# Patient Record
Sex: FEMALE | Race: WHITE | Employment: OTHER | URBAN - METROPOLITAN AREA
[De-identification: names, ages, dates, MRNs, and addresses within clinical notes are randomized per-mention and may not be internally consistent; named-entity substitution may affect disease eponyms.]

---

## 2019-08-19 PROCEDURE — 87624 HPV HI-RISK TYP POOLED RSLT: CPT | Performed by: PHYSICIAN ASSISTANT

## 2019-08-19 PROCEDURE — 88175 CYTOPATH C/V AUTO FLUID REDO: CPT | Performed by: PHYSICIAN ASSISTANT

## 2023-02-05 ENCOUNTER — APPOINTMENT (OUTPATIENT)
Dept: CT IMAGING | Facility: HOSPITAL | Age: 48
End: 2023-02-05
Attending: EMERGENCY MEDICINE
Payer: COMMERCIAL

## 2023-02-05 ENCOUNTER — APPOINTMENT (OUTPATIENT)
Dept: GENERAL RADIOLOGY | Facility: HOSPITAL | Age: 48
End: 2023-02-05
Attending: EMERGENCY MEDICINE
Payer: COMMERCIAL

## 2023-02-05 ENCOUNTER — HOSPITAL ENCOUNTER (EMERGENCY)
Facility: HOSPITAL | Age: 48
Discharge: HOME | End: 2023-02-05
Attending: EMERGENCY MEDICINE
Payer: COMMERCIAL

## 2023-02-05 VITALS
HEIGHT: 63 IN | RESPIRATION RATE: 16 BRPM | SYSTOLIC BLOOD PRESSURE: 94 MMHG | HEART RATE: 72 BPM | BODY MASS INDEX: 26.58 KG/M2 | WEIGHT: 150 LBS | DIASTOLIC BLOOD PRESSURE: 54 MMHG | OXYGEN SATURATION: 98 % | TEMPERATURE: 98 F

## 2023-02-05 DIAGNOSIS — S01.81XA FACIAL LACERATION, INITIAL ENCOUNTER: Primary | ICD-10-CM

## 2023-02-05 DIAGNOSIS — S02.609A CLOSED FRACTURE OF LEFT SIDE OF MANDIBLE, UNSPECIFIED MANDIBULAR SITE, INITIAL ENCOUNTER (HCC): ICD-10-CM

## 2023-02-05 DIAGNOSIS — S42.202A CLOSED FRACTURE OF PROXIMAL END OF LEFT HUMERUS, UNSPECIFIED FRACTURE MORPHOLOGY, INITIAL ENCOUNTER: ICD-10-CM

## 2023-02-05 DIAGNOSIS — S09.90XA INJURY OF HEAD, INITIAL ENCOUNTER: ICD-10-CM

## 2023-02-05 PROCEDURE — 73060 X-RAY EXAM OF HUMERUS: CPT | Performed by: EMERGENCY MEDICINE

## 2023-02-05 PROCEDURE — 90471 IMMUNIZATION ADMIN: CPT

## 2023-02-05 PROCEDURE — 72125 CT NECK SPINE W/O DYE: CPT | Performed by: EMERGENCY MEDICINE

## 2023-02-05 PROCEDURE — 96375 TX/PRO/DX INJ NEW DRUG ADDON: CPT

## 2023-02-05 PROCEDURE — 76377 3D RENDER W/INTRP POSTPROCES: CPT | Performed by: EMERGENCY MEDICINE

## 2023-02-05 PROCEDURE — 96376 TX/PRO/DX INJ SAME DRUG ADON: CPT

## 2023-02-05 PROCEDURE — 99285 EMERGENCY DEPT VISIT HI MDM: CPT

## 2023-02-05 PROCEDURE — 70450 CT HEAD/BRAIN W/O DYE: CPT | Performed by: EMERGENCY MEDICINE

## 2023-02-05 PROCEDURE — 12013 RPR F/E/E/N/L/M 2.6-5.0 CM: CPT

## 2023-02-05 PROCEDURE — 96374 THER/PROPH/DIAG INJ IV PUSH: CPT

## 2023-02-05 PROCEDURE — 99284 EMERGENCY DEPT VISIT MOD MDM: CPT

## 2023-02-05 PROCEDURE — 70486 CT MAXILLOFACIAL W/O DYE: CPT | Performed by: EMERGENCY MEDICINE

## 2023-02-05 RX ORDER — HYDROCODONE BITARTRATE AND ACETAMINOPHEN 5; 325 MG/1; MG/1
1-2 TABLET ORAL EVERY 6 HOURS PRN
Qty: 20 TABLET | Refills: 0 | Status: ON HOLD | OUTPATIENT
Start: 2023-02-05 | End: 2023-02-08

## 2023-02-05 RX ORDER — HYDROCODONE BITARTRATE AND ACETAMINOPHEN 5; 325 MG/1; MG/1
2 TABLET ORAL ONCE
Status: COMPLETED | OUTPATIENT
Start: 2023-02-05 | End: 2023-02-05

## 2023-02-05 RX ORDER — OXYCODONE HYDROCHLORIDE AND ACETAMINOPHEN 5; 325 MG/1; MG/1
1-2 TABLET ORAL EVERY 6 HOURS PRN
Qty: 20 TABLET | Refills: 0 | Status: SHIPPED | OUTPATIENT
Start: 2023-02-05 | End: 2023-02-08

## 2023-02-05 RX ORDER — ONDANSETRON 4 MG/1
4 TABLET, ORALLY DISINTEGRATING ORAL EVERY 4 HOURS PRN
Qty: 20 TABLET | Refills: 0 | Status: SHIPPED | OUTPATIENT
Start: 2023-02-05 | End: 2023-02-10

## 2023-02-05 RX ORDER — AMOXICILLIN AND CLAVULANATE POTASSIUM 875; 125 MG/1; MG/1
1 TABLET, FILM COATED ORAL 2 TIMES DAILY
Qty: 14 TABLET | Refills: 0 | Status: SHIPPED | OUTPATIENT
Start: 2023-02-05 | End: 2023-02-12

## 2023-02-05 RX ORDER — ONDANSETRON 2 MG/ML
4 INJECTION INTRAMUSCULAR; INTRAVENOUS ONCE
Status: COMPLETED | OUTPATIENT
Start: 2023-02-05 | End: 2023-02-05

## 2023-02-05 RX ORDER — BUPIVACAINE HYDROCHLORIDE 5 MG/ML
1 INJECTION, SOLUTION EPIDURAL; INTRACAUDAL ONCE
Status: COMPLETED | OUTPATIENT
Start: 2023-02-05 | End: 2023-02-05

## 2023-02-05 RX ORDER — MORPHINE SULFATE 4 MG/ML
4 INJECTION, SOLUTION INTRAMUSCULAR; INTRAVENOUS ONCE
Status: COMPLETED | OUTPATIENT
Start: 2023-02-05 | End: 2023-02-05

## 2023-02-05 RX ORDER — BUPIVACAINE HYDROCHLORIDE 5 MG/ML
INJECTION, SOLUTION EPIDURAL; INTRACAUDAL
Status: COMPLETED
Start: 2023-02-05 | End: 2023-02-05

## 2023-02-05 NOTE — DISCHARGE INSTRUCTIONS
Return to ER if fever, discharge, redness or other problems    Clean and dry for 48 hours    Polysporin to wound    Suture removal 10 days    You have a humerus fracture and mandibular fracture which will require orthopedic and oral surgery follow-up

## 2023-02-05 NOTE — ED INITIAL ASSESSMENT (HPI)
Pt sustained head lac with L upper arm deformity & L shoulder pain after falling while getting into a cab 2 hrs PTA, denies anyy LOC, admits to etoh consumption

## 2023-02-07 ENCOUNTER — ANESTHESIA EVENT (OUTPATIENT)
Dept: SURGERY | Facility: HOSPITAL | Age: 48
End: 2023-02-07
Payer: COMMERCIAL

## 2023-02-07 ENCOUNTER — HOSPITAL ENCOUNTER (OUTPATIENT)
Facility: HOSPITAL | Age: 48
Discharge: HOME | End: 2023-02-08
Attending: ORTHOPAEDIC SURGERY | Admitting: ORTHOPAEDIC SURGERY
Payer: COMMERCIAL

## 2023-02-07 ENCOUNTER — ANESTHESIA (OUTPATIENT)
Dept: SURGERY | Facility: HOSPITAL | Age: 48
End: 2023-02-07
Payer: COMMERCIAL

## 2023-02-07 ENCOUNTER — APPOINTMENT (OUTPATIENT)
Dept: GENERAL RADIOLOGY | Facility: HOSPITAL | Age: 48
End: 2023-02-07
Attending: ORTHOPAEDIC SURGERY
Payer: COMMERCIAL

## 2023-02-07 LAB
B-HCG UR QL: NEGATIVE
SARS-COV-2 RNA RESP QL NAA+PROBE: NOT DETECTED

## 2023-02-07 PROCEDURE — 0PSG04Z REPOSITION LEFT HUMERAL SHAFT WITH INTERNAL FIXATION DEVICE, OPEN APPROACH: ICD-10-PCS | Performed by: ORTHOPAEDIC SURGERY

## 2023-02-07 PROCEDURE — 76942 ECHO GUIDE FOR BIOPSY: CPT | Performed by: STUDENT IN AN ORGANIZED HEALTH CARE EDUCATION/TRAINING PROGRAM

## 2023-02-07 PROCEDURE — 81025 URINE PREGNANCY TEST: CPT

## 2023-02-07 PROCEDURE — 76000 FLUOROSCOPY <1 HR PHYS/QHP: CPT | Performed by: ORTHOPAEDIC SURGERY

## 2023-02-07 DEVICE — IMPLANTABLE DEVICE: Type: IMPLANTABLE DEVICE | Site: HUMERUS | Status: FUNCTIONAL

## 2023-02-07 RX ORDER — HYDROMORPHONE HYDROCHLORIDE 1 MG/ML
0.2 INJECTION, SOLUTION INTRAMUSCULAR; INTRAVENOUS; SUBCUTANEOUS EVERY 5 MIN PRN
Status: DISCONTINUED | OUTPATIENT
Start: 2023-02-07 | End: 2023-02-07 | Stop reason: HOSPADM

## 2023-02-07 RX ORDER — DEXAMETHASONE SODIUM PHOSPHATE 10 MG/ML
INJECTION, SOLUTION INTRAMUSCULAR; INTRAVENOUS AS NEEDED
Status: DISCONTINUED | OUTPATIENT
Start: 2023-02-07 | End: 2023-02-07 | Stop reason: SURG

## 2023-02-07 RX ORDER — OXYCODONE HYDROCHLORIDE AND ACETAMINOPHEN 5; 325 MG/1; MG/1
1-2 TABLET ORAL EVERY 6 HOURS PRN
Status: DISCONTINUED | OUTPATIENT
Start: 2023-02-07 | End: 2023-02-08

## 2023-02-07 RX ORDER — ONDANSETRON 2 MG/ML
INJECTION INTRAMUSCULAR; INTRAVENOUS AS NEEDED
Status: DISCONTINUED | OUTPATIENT
Start: 2023-02-07 | End: 2023-02-07 | Stop reason: SURG

## 2023-02-07 RX ORDER — SODIUM CHLORIDE, SODIUM LACTATE, POTASSIUM CHLORIDE, CALCIUM CHLORIDE 600; 310; 30; 20 MG/100ML; MG/100ML; MG/100ML; MG/100ML
INJECTION, SOLUTION INTRAVENOUS CONTINUOUS
Status: DISCONTINUED | OUTPATIENT
Start: 2023-02-07 | End: 2023-02-08

## 2023-02-07 RX ORDER — POLYETHYLENE GLYCOL 3350 17 G/17G
17 POWDER, FOR SOLUTION ORAL DAILY PRN
Status: DISCONTINUED | OUTPATIENT
Start: 2023-02-07 | End: 2023-02-08

## 2023-02-07 RX ORDER — ONDANSETRON 4 MG/1
4 TABLET, ORALLY DISINTEGRATING ORAL EVERY 4 HOURS PRN
Status: DISCONTINUED | OUTPATIENT
Start: 2023-02-07 | End: 2023-02-08

## 2023-02-07 RX ORDER — ACETAMINOPHEN 500 MG
1000 TABLET ORAL ONCE AS NEEDED
Status: DISCONTINUED | OUTPATIENT
Start: 2023-02-07 | End: 2023-02-07 | Stop reason: HOSPADM

## 2023-02-07 RX ORDER — SODIUM PHOSPHATE, DIBASIC AND SODIUM PHOSPHATE, MONOBASIC 7; 19 G/133ML; G/133ML
1 ENEMA RECTAL ONCE AS NEEDED
Status: DISCONTINUED | OUTPATIENT
Start: 2023-02-07 | End: 2023-02-08

## 2023-02-07 RX ORDER — NALOXONE HYDROCHLORIDE 0.4 MG/ML
80 INJECTION, SOLUTION INTRAMUSCULAR; INTRAVENOUS; SUBCUTANEOUS AS NEEDED
Status: DISCONTINUED | OUTPATIENT
Start: 2023-02-07 | End: 2023-02-07 | Stop reason: HOSPADM

## 2023-02-07 RX ORDER — DEXAMETHASONE SODIUM PHOSPHATE 4 MG/ML
VIAL (ML) INJECTION AS NEEDED
Status: DISCONTINUED | OUTPATIENT
Start: 2023-02-07 | End: 2023-02-07 | Stop reason: SURG

## 2023-02-07 RX ORDER — BUPRENORPHINE HYDROCHLORIDE 0.32 MG/ML
INJECTION INTRAMUSCULAR; INTRAVENOUS AS NEEDED
Status: DISCONTINUED | OUTPATIENT
Start: 2023-02-07 | End: 2023-02-07 | Stop reason: SURG

## 2023-02-07 RX ORDER — HYDROMORPHONE HYDROCHLORIDE 1 MG/ML
0.6 INJECTION, SOLUTION INTRAMUSCULAR; INTRAVENOUS; SUBCUTANEOUS EVERY 5 MIN PRN
Status: DISCONTINUED | OUTPATIENT
Start: 2023-02-07 | End: 2023-02-07 | Stop reason: HOSPADM

## 2023-02-07 RX ORDER — IBUPROFEN 200 MG
800 TABLET ORAL EVERY 6 HOURS PRN
COMMUNITY

## 2023-02-07 RX ORDER — ONDANSETRON 2 MG/ML
4 INJECTION INTRAMUSCULAR; INTRAVENOUS EVERY 6 HOURS PRN
Status: DISCONTINUED | OUTPATIENT
Start: 2023-02-07 | End: 2023-02-08

## 2023-02-07 RX ORDER — SCOLOPAMINE TRANSDERMAL SYSTEM 1 MG/1
1 PATCH, EXTENDED RELEASE TRANSDERMAL ONCE
Status: DISCONTINUED | OUTPATIENT
Start: 2023-02-07 | End: 2023-02-07 | Stop reason: HOSPADM

## 2023-02-07 RX ORDER — HYDROCODONE BITARTRATE AND ACETAMINOPHEN 5; 325 MG/1; MG/1
1-2 TABLET ORAL EVERY 6 HOURS PRN
Status: DISCONTINUED | OUTPATIENT
Start: 2023-02-07 | End: 2023-02-08

## 2023-02-07 RX ORDER — PROCHLORPERAZINE EDISYLATE 5 MG/ML
5 INJECTION INTRAMUSCULAR; INTRAVENOUS EVERY 8 HOURS PRN
Status: DISCONTINUED | OUTPATIENT
Start: 2023-02-07 | End: 2023-02-08

## 2023-02-07 RX ORDER — GLYCOPYRROLATE 0.2 MG/ML
INJECTION, SOLUTION INTRAMUSCULAR; INTRAVENOUS AS NEEDED
Status: DISCONTINUED | OUTPATIENT
Start: 2023-02-07 | End: 2023-02-07 | Stop reason: SURG

## 2023-02-07 RX ORDER — DIPHENHYDRAMINE HYDROCHLORIDE 50 MG/ML
INJECTION INTRAMUSCULAR; INTRAVENOUS AS NEEDED
Status: DISCONTINUED | OUTPATIENT
Start: 2023-02-07 | End: 2023-02-07 | Stop reason: SURG

## 2023-02-07 RX ORDER — ONDANSETRON 2 MG/ML
4 INJECTION INTRAMUSCULAR; INTRAVENOUS EVERY 6 HOURS PRN
Status: DISCONTINUED | OUTPATIENT
Start: 2023-02-07 | End: 2023-02-07 | Stop reason: HOSPADM

## 2023-02-07 RX ORDER — MEPERIDINE HYDROCHLORIDE 25 MG/ML
12.5 INJECTION INTRAMUSCULAR; INTRAVENOUS; SUBCUTANEOUS AS NEEDED
Status: DISCONTINUED | OUTPATIENT
Start: 2023-02-07 | End: 2023-02-07 | Stop reason: HOSPADM

## 2023-02-07 RX ORDER — ROPIVACAINE HYDROCHLORIDE 5 MG/ML
INJECTION, SOLUTION EPIDURAL; INFILTRATION; PERINEURAL AS NEEDED
Status: DISCONTINUED | OUTPATIENT
Start: 2023-02-07 | End: 2023-02-07 | Stop reason: SURG

## 2023-02-07 RX ORDER — NEOSTIGMINE METHYLSULFATE 1 MG/ML
INJECTION, SOLUTION INTRAVENOUS AS NEEDED
Status: DISCONTINUED | OUTPATIENT
Start: 2023-02-07 | End: 2023-02-07 | Stop reason: SURG

## 2023-02-07 RX ORDER — SODIUM CHLORIDE, SODIUM LACTATE, POTASSIUM CHLORIDE, CALCIUM CHLORIDE 600; 310; 30; 20 MG/100ML; MG/100ML; MG/100ML; MG/100ML
INJECTION, SOLUTION INTRAVENOUS CONTINUOUS
Status: DISCONTINUED | OUTPATIENT
Start: 2023-02-07 | End: 2023-02-07 | Stop reason: HOSPADM

## 2023-02-07 RX ORDER — LIDOCAINE HYDROCHLORIDE 10 MG/ML
INJECTION, SOLUTION EPIDURAL; INFILTRATION; INTRACAUDAL; PERINEURAL AS NEEDED
Status: DISCONTINUED | OUTPATIENT
Start: 2023-02-07 | End: 2023-02-07 | Stop reason: SURG

## 2023-02-07 RX ORDER — MIDAZOLAM HYDROCHLORIDE 1 MG/ML
INJECTION INTRAMUSCULAR; INTRAVENOUS AS NEEDED
Status: DISCONTINUED | OUTPATIENT
Start: 2023-02-07 | End: 2023-02-07 | Stop reason: SURG

## 2023-02-07 RX ORDER — SENNOSIDES 8.6 MG
17.2 TABLET ORAL NIGHTLY
Status: DISCONTINUED | OUTPATIENT
Start: 2023-02-07 | End: 2023-02-08

## 2023-02-07 RX ORDER — AMOXICILLIN AND CLAVULANATE POTASSIUM 875; 125 MG/1; MG/1
1 TABLET, FILM COATED ORAL 2 TIMES DAILY
Status: DISCONTINUED | OUTPATIENT
Start: 2023-02-07 | End: 2023-02-08

## 2023-02-07 RX ORDER — DOCUSATE SODIUM 100 MG/1
100 CAPSULE, LIQUID FILLED ORAL 2 TIMES DAILY
Status: DISCONTINUED | OUTPATIENT
Start: 2023-02-07 | End: 2023-02-08

## 2023-02-07 RX ORDER — PROCHLORPERAZINE EDISYLATE 5 MG/ML
5 INJECTION INTRAMUSCULAR; INTRAVENOUS EVERY 8 HOURS PRN
Status: DISCONTINUED | OUTPATIENT
Start: 2023-02-07 | End: 2023-02-07 | Stop reason: HOSPADM

## 2023-02-07 RX ORDER — BISACODYL 10 MG
10 SUPPOSITORY, RECTAL RECTAL
Status: DISCONTINUED | OUTPATIENT
Start: 2023-02-07 | End: 2023-02-08

## 2023-02-07 RX ORDER — MIDAZOLAM HYDROCHLORIDE 1 MG/ML
1 INJECTION INTRAMUSCULAR; INTRAVENOUS EVERY 5 MIN PRN
Status: DISCONTINUED | OUTPATIENT
Start: 2023-02-07 | End: 2023-02-07 | Stop reason: HOSPADM

## 2023-02-07 RX ORDER — ROCURONIUM BROMIDE 10 MG/ML
INJECTION, SOLUTION INTRAVENOUS AS NEEDED
Status: DISCONTINUED | OUTPATIENT
Start: 2023-02-07 | End: 2023-02-07 | Stop reason: SURG

## 2023-02-07 RX ORDER — DIPHENHYDRAMINE HYDROCHLORIDE 50 MG/ML
12.5 INJECTION INTRAMUSCULAR; INTRAVENOUS AS NEEDED
Status: DISCONTINUED | OUTPATIENT
Start: 2023-02-07 | End: 2023-02-07 | Stop reason: HOSPADM

## 2023-02-07 RX ORDER — CEFAZOLIN SODIUM/WATER 2 G/20 ML
2 SYRINGE (ML) INTRAVENOUS ONCE
Status: COMPLETED | OUTPATIENT
Start: 2023-02-07 | End: 2023-02-07

## 2023-02-07 RX ORDER — HYDROCODONE BITARTRATE AND ACETAMINOPHEN 10; 325 MG/1; MG/1
1 TABLET ORAL ONCE AS NEEDED
Status: DISCONTINUED | OUTPATIENT
Start: 2023-02-07 | End: 2023-02-07 | Stop reason: HOSPADM

## 2023-02-07 RX ORDER — HYDROMORPHONE HYDROCHLORIDE 1 MG/ML
0.4 INJECTION, SOLUTION INTRAMUSCULAR; INTRAVENOUS; SUBCUTANEOUS EVERY 5 MIN PRN
Status: DISCONTINUED | OUTPATIENT
Start: 2023-02-07 | End: 2023-02-07 | Stop reason: HOSPADM

## 2023-02-07 RX ORDER — IBUPROFEN 400 MG/1
800 TABLET ORAL EVERY 6 HOURS PRN
Status: DISCONTINUED | OUTPATIENT
Start: 2023-02-07 | End: 2023-02-08

## 2023-02-07 RX ORDER — HYDROCODONE BITARTRATE AND ACETAMINOPHEN 10; 325 MG/1; MG/1
2 TABLET ORAL ONCE AS NEEDED
Status: DISCONTINUED | OUTPATIENT
Start: 2023-02-07 | End: 2023-02-07 | Stop reason: HOSPADM

## 2023-02-07 RX ORDER — CEFAZOLIN SODIUM/WATER 2 G/20 ML
2 SYRINGE (ML) INTRAVENOUS EVERY 8 HOURS
Status: COMPLETED | OUTPATIENT
Start: 2023-02-08 | End: 2023-02-08

## 2023-02-07 RX ORDER — ACETAMINOPHEN 500 MG
1000 TABLET ORAL ONCE
Status: DISCONTINUED | OUTPATIENT
Start: 2023-02-07 | End: 2023-02-07 | Stop reason: HOSPADM

## 2023-02-07 RX ORDER — DOXEPIN HYDROCHLORIDE 50 MG/1
1 CAPSULE ORAL DAILY
Status: DISCONTINUED | OUTPATIENT
Start: 2023-02-08 | End: 2023-02-08

## 2023-02-07 RX ADMIN — DIPHENHYDRAMINE HYDROCHLORIDE 12.5 MG: 50 INJECTION INTRAMUSCULAR; INTRAVENOUS at 20:08:00

## 2023-02-07 RX ADMIN — LIDOCAINE HYDROCHLORIDE 50 MG: 10 INJECTION, SOLUTION EPIDURAL; INFILTRATION; INTRACAUDAL; PERINEURAL at 19:40:00

## 2023-02-07 RX ADMIN — ONDANSETRON 4 MG: 2 INJECTION INTRAMUSCULAR; INTRAVENOUS at 22:07:00

## 2023-02-07 RX ADMIN — DEXAMETHASONE SODIUM PHOSPHATE 4 MG: 4 MG/ML VIAL (ML) INJECTION at 20:08:00

## 2023-02-07 RX ADMIN — ROCURONIUM BROMIDE 50 MG: 10 INJECTION, SOLUTION INTRAVENOUS at 19:53:00

## 2023-02-07 RX ADMIN — BUPRENORPHINE HYDROCHLORIDE 150 MCG: 0.32 INJECTION INTRAMUSCULAR; INTRAVENOUS at 19:39:00

## 2023-02-07 RX ADMIN — SODIUM CHLORIDE, SODIUM LACTATE, POTASSIUM CHLORIDE, CALCIUM CHLORIDE: 600; 310; 30; 20 INJECTION, SOLUTION INTRAVENOUS at 20:58:00

## 2023-02-07 RX ADMIN — CEFAZOLIN SODIUM/WATER 2 G: 2 G/20 ML SYRINGE (ML) INTRAVENOUS at 20:12:00

## 2023-02-07 RX ADMIN — MIDAZOLAM HYDROCHLORIDE 2 MG: 1 INJECTION INTRAMUSCULAR; INTRAVENOUS at 19:30:00

## 2023-02-07 RX ADMIN — DEXAMETHASONE SODIUM PHOSPHATE 2 MG: 10 INJECTION, SOLUTION INTRAMUSCULAR; INTRAVENOUS at 19:39:00

## 2023-02-07 RX ADMIN — NEOSTIGMINE METHYLSULFATE 2.5 MG: 1 INJECTION, SOLUTION INTRAVENOUS at 22:17:00

## 2023-02-07 RX ADMIN — GLYCOPYRROLATE 0.4 MG: 0.2 INJECTION, SOLUTION INTRAMUSCULAR; INTRAVENOUS at 22:17:00

## 2023-02-07 RX ADMIN — ROPIVACAINE HYDROCHLORIDE 30 ML: 5 INJECTION, SOLUTION EPIDURAL; INFILTRATION; PERINEURAL at 19:39:00

## 2023-02-08 VITALS
RESPIRATION RATE: 18 BRPM | TEMPERATURE: 98 F | WEIGHT: 155 LBS | SYSTOLIC BLOOD PRESSURE: 102 MMHG | OXYGEN SATURATION: 100 % | BODY MASS INDEX: 27.46 KG/M2 | DIASTOLIC BLOOD PRESSURE: 50 MMHG | HEIGHT: 63 IN | HEART RATE: 70 BPM

## 2023-02-08 LAB
HCT VFR BLD AUTO: 32.5 %
HGB BLD-MCNC: 10.7 G/DL

## 2023-02-08 PROCEDURE — 97165 OT EVAL LOW COMPLEX 30 MIN: CPT

## 2023-02-08 PROCEDURE — 85014 HEMATOCRIT: CPT | Performed by: ORTHOPAEDIC SURGERY

## 2023-02-08 PROCEDURE — 97535 SELF CARE MNGMENT TRAINING: CPT

## 2023-02-08 PROCEDURE — 85018 HEMOGLOBIN: CPT | Performed by: ORTHOPAEDIC SURGERY

## 2023-02-08 PROCEDURE — 97110 THERAPEUTIC EXERCISES: CPT

## 2023-02-08 RX ORDER — HYDROCODONE BITARTRATE AND ACETAMINOPHEN 5; 325 MG/1; MG/1
1-2 TABLET ORAL EVERY 6 HOURS PRN
Qty: 20 TABLET | Refills: 0 | Status: SHIPPED | OUTPATIENT
Start: 2023-02-08 | End: 2023-02-13

## 2023-02-08 NOTE — PLAN OF CARE
Patient A&Ox4, VSS on room air. POD #1, dressing changed this morning per MD orders. New dressing C/D/I. Ice applied. Reporting moderate pain to R arm, PO pain medication given with adequate relief. Ambulating with therapy, tolerating well. Plan to DC home today, plan of care reviewed with patient and  at bedside, all questions answered, verbalized understanding.

## 2023-02-08 NOTE — PLAN OF CARE
A&Ox 4. VSS. On RA . . IS encouraged. SCDs on BLE. Ankle Pumps encouraged. Tolerating general  diet. Last BM 2/6 . Voiding to the bathroom. Pain controlled. Ambulating. Plan is PT/OT. Patient updated on plan of care. Safety precautions in place. Call light within reach. Left arm in sling. Microfoam dressing intact. Ice pack applied. Left arm precautions.

## 2023-02-08 NOTE — ANESTHESIA POSTPROCEDURE EVALUATION
[facility] Patient Status:  Hospital Outpatient Surgery   Age/Gender 52year old female MRN [id number]   [facility] SURGERY Attending Donel Eisenmenger, MD   Hosp Day # 0 PCP RAHEEL Celis       Anesthesia Post-op Note    OPEN REDUCTION INTERNAL FIXATION LEFT HUMERAL SHAFT FRACTURE    Procedure Summary     Date: 02/07/23 Room / Location: 1404 [facility] MAIN OR 05 / 1404 [facility] OR    Anesthesia Start: 1927 Anesthesia Stop:     Procedure: OPEN REDUCTION INTERNAL FIXATION LEFT HUMERAL SHAFT FRACTURE (Left: Upper Arm) Diagnosis:       Fracture of humeral shaft, left, closed      (Fracture of humeral shaft, left, closed Corey Fails)    Surgeons: Donel Eisenmenger, MD Anesthesiologist: Pepe Sanchez MD    Anesthesia Type: general, regional ASA Status: 2          Anesthesia Type: general, regional    Vitals Value Taken Time   /78 02/07/23 2227   Temp 97.4 02/07/23 2229   Pulse 85 02/07/23 2229   Resp 24 02/07/23 2229   SpO2 99 % 02/07/23 2229   Vitals shown include unvalidated device data. Patient Location: PACU    Anesthesia Type: general and regional    Airway Patency: extubated    Postop Pain Control: adequate    Mental Status: mildly sedated but able to meaningfully participate in the post-anesthesia evaluation    Nausea/Vomiting: none    Cardiopulmonary/Hydration status: stable euvolemic    Complications: no apparent anesthesia related complications    Postop vital signs: stable    Dental Exam: Unchanged from Preop    Patient to be discharged from PACU when criteria met.

## 2023-02-08 NOTE — ANESTHESIA PROCEDURE NOTES
Regional Block    Date/Time: 2/7/2023 7:30 PM  Performed by: Pepe Sanchez MD  Authorized by: Pepe Sanchez MD       General Information and Staff    Start Time:  2/7/2023 7:30 PM  End Time:  2/7/2023 7:39 PM  Anesthesiologist:  Pepe Sanchez MD  Performed by: Anesthesiologist  Patient Location:  OR    Block Placement: Pre Induction  Site Identification: real time ultrasound guided and image stored and retrievable    Block site/laterality marked before start: site marked  Reason for Block: at surgeon's request and post-op pain management    Preanesthetic Checklist: 2 patient identifers, IV checked, risks and benefits discussed, monitors and equipment checked, pre-op evaluation, timeout performed, anesthesia consent, sterile technique used, no prohibitive neurological deficits and no local skin infection at insertion site      Procedure Details    Patient Position:  Supine  Prep: ChloraPrep    Monitoring:  Cardiac monitor, continuous pulse ox and blood pressure cuff  Block Type: Interscalene  Laterality:  Left  Injection Technique:  Single-shot    Needle    Needle Type:  Short-bevel and echogenic  Needle Gauge:  22 G  Needle Length:  50 mm  Needle Localization:  Ultrasound guidance and nerve stimulator  Reason for Ultrasound Use: appropriate spread of the medication was noted in real time and no ultrasound evidence of intravascular and/or intraneural injection    Nerve Stimulator: 0.5 amps  Muscle Twitch Response: deltoid response      Assessment    Injection Assessment:  Good spread noted, negative resistance, negative aspiration for heme, incremental injection and low pressure  Heart Rate Change: No    - Patient tolerated block procedure well without evidence of immediate block related complications.      Medications  2/7/2023 7:30 PM      Additional Comments    Medication:  Ropivacaine 0.5% 30mL with 2mg PF Decadron and 150 mcg Buprenorphine

## 2023-02-08 NOTE — OPERATIVE REPORT
[address]    PATIENT'S NAME: Sherly Lock   ATTENDING PHYSICIAN: Ezekiel Mcgraw M.D. OPERATING PHYSICIAN: Ezekiel Mcgraw M.D. PATIENT ACCOUNT#:   [de-identified]    LOCATION:  [address] [place] [address]  MEDICAL RECORD #:   [id number]       YOB: 1975  ADMISSION DATE:       02/07/2023      OPERATION DATE:  02/07/2023    OPERATIVE REPORT    PREOPERATIVE DIAGNOSIS:  Left humeral shaft fracture. POSTOPERATIVE DIAGNOSIS:  Left humeral shaft fracture. PROCEDURE:  Open reduction external fixation of left humeral shaft fracture. ANESTHESIA:  General plus interscalene block. BLOOD LOSS:  150 mL. SPECIMENS:  None. COMPLICATIONS:  None. DISPOSITION:  Fair condition to the recovery room. PLAN:  Patient will be admitted for postoperative observation. INDICATIONS:  The patient is a 80-year-old female who had fallen and sustained an injury to her left arm. She had a displaced comminuted proximal humerus fracture. She was, therefore, offered surgical intervention. The risks and benefits of the procedure were discussed in detail with the patient and her family including risks of problems with skin and bone healing, stiffness, skin healing problems, infection. She shows good understanding of these issues and wished to proceed with surgery. FINDINGS:  Patient with comminuted fracture of the proximal third of the humeral shaft. OPERATIVE TECHNIQUE:  On date of operation, I saw the patient in the holding room and initialed the surgical site. The patient was taken the operating room and was placed in supine position on the OR table. Interscalene block was performed by Anesthesia. This was followed by general endotracheal anesthesia. The patient was given a preoperative dose of antibiotics. She was placed in a beach chair position. The left shoulder was prepped and draped in standard surgical fashion.   A surgical time-out was taken in which the proper patient, surgical site, and procedure were verified. An anterior approach to the proximal humerus which extended into an anterior approach to the humeral shaft was performed. Dissection was carried down through soft tissue and full-thickness skin flaps were raised. The interval between the deltoid and pectoralis major was opened proximally, and care was taken to preserve the insertion of the deltoid in the humerus. Distally, the biceps was retracted medially and then the brachialis was then divided between its middle and lateral thirds to expose the humeral shaft fracture. The fracture of the humerus was a fracture with comminution at the insertion site of the deltoid. The [employer] S3 plate was then utilized. The plate was placed proximally just lateral to the long head of the biceps. The central guidewire was placed and multiple fluoroscopic images were taken to ensure good position as far as the humeral head was concerned. Smooth pegs were then placed proximally using 4 of these to secure the proximal fragment. The main fragment was then reduced to the plate and, while holding this in reduced position, a lag screw was then placed. Another lag screw was placed across the long oblique fragment. Three screws were then placed distally to secure the distal fragment. Following this, multiple fluoroscopic images were taken to ensure near-anatomic reduction as well as good placement of the hardware and the plate. The arm was placed through a full range of motion, and the fixation was stable. The wound was copiously irrigated, and the skin flaps closed with 2-0 Vicryl in subcutaneous tissue, followed by staples. Sterile bulky dressings were applied. The patient was taken to the recovery room in fair condition. She will be admitted for postoperative observation.      Dictated By Luis Fernando Grimm M.D.  d: 02/07/2023 22:36:04  t: 02/07/2023 79:72:52  [facility] [id number]  /

## 2023-02-08 NOTE — PROGRESS NOTES
Norco to be delivered when filled , will dc home shortly after, AVS reviewed w/ Collinston Seip, spouse at bedside.

## 2023-02-08 NOTE — DISCHARGE INSTRUCTIONS
Move fingers often, move arm as tolerated  Elevate and ice the extremity  Keep dressings in place  Sling as needed  Return to clinic next week                  Home Care Instructions Following Your Fracture Repair     Vandana-  We hope you were pleased with your care at [facility].  We wish you the best outcome and overall experience with your operation. These instructions will help to minimize pain, limit the risk of infection, and improve the likelihood of a successful result. Splint  Your splint will help protect the repair of your fracture. Keep your splint on at all times  Do not remove your splint  Do not get your splint wet. Keep your splint clean and dry. Wear splint when sleeping. Bathing/Showers  You can shower with a plastic bag over your bandage. Keep your splint dry. No swimming, baths, or hot tubs until you receive medical permission. Cold Therapy  Cold therapy will help minimize swelling, improve, comfort, and limit the need for pain medication. Apply a bag of ice wrapped in a towel for 20 minutes three times daily  An ice bag should never come in direct contact with the skin. Immediately contact Dr. [facility]OROCHO [facility], if you experience excruciating pain not helped by pain medication, burning, blisters, redness, discoloration, or other changes in skin appearance    Hand Elevation  Strict hand elevation will help to minimize pain, improve healing, and decrease the risk of infection. Keep hand elevated on two pillows when sitting or lying  Maintain hand above the level of your heart as much as possible  Keep hand at shoulder level during the day. If your hand starts to throb, hurt, and swell; It might be a sign that you are not elevating it. Pain Medication: Norco or Tylenol #3  Dr. Madeline Moulton will prescribe either Norco or Tylenol #3 for your pain. Norco or Tylenol #3: Take one or two tablets every four hours as needed for pain.   Do not exceed 8 (eight) tablets each day  Do not take Norco/Tylenol #3, if you do not have pain. Over-The-Counter Medication  Non-prescription anti-inflammatory medications can also help to ease the pain. You can take Aleve or ibuprofen   Take as directed on the bottle  Drink a full glass of water with the medication    Home Medication  Resume your home medications as instructed    Diet  Resume your normal diet    Activity  No strenuous activity  You can go up and down the stairs as tolerated. Use common sense  You cannot return to work, if your work requires strenuous activity with your arms or hands. You cannot return to sports or physical exercise until you have received medical permission. Return to Work or [place] can return to work in 2-3, if your work does not involve strenuous activity with your arms or hands  You can return to school in 2-3 days but not gym class for 4-6 weeks. Driving  Do not drive, if you are wearing a splint and/or taking pain medication. Follow-up Appointment With The Hand Therapist (Occupational Therapist)  An appointment with the hand therapist was arranged for 3-5 days after your operation at the time your procedure was scheduled. Call Dr. Carrie Martell office today for an appointment with the hand therapist in 3-5 days, if you cannot remember it. Verify your appointment date, day, time, and location. At your 1st postoperative office visit:  Your splint will be removed and the wounds will be evaluated, and any additional concerns and instructions will be discussed. Follow-up Appointment with Dr. Castillo Felton  Call Dr. Milton Cardenas office today for an appointment in 10-14 days. Verify your appointment date, day, time, and location. At your 1st postoperative office visit:  Your wounds will be evaluated, healing assessed, and any additional concerns and instructions will be discussed.     Questions or Concerns  Call Dr. Milton Cardenas office if you experience severe pain not controlled by pain medication, swelling, numbness, tingling, bleeding, fever, or other concerns. If your call is made after office hours, a physician's assistant or nurse practitioner will be available to help you. There is always a surgeon covering Dr. Pacheco Martínez patients, if he is unavailable. Vandana  Thank you for coming to [facility] for your operation. The nurses and the anesthesiologist try very hard to make sure you receive the best care possible. Your trust in them is greatly appreciated.     Thanks so much,  Dr. Wise Matters

## 2023-02-08 NOTE — ANESTHESIA PROCEDURE NOTES
Airway  Date/Time: 2/7/2023 7:46 PM  Urgency: elective      General Information and Staff    Patient location during procedure: OR  Anesthesiologist: Abhishek Fajardo MD  Performed: anesthesiologist     Indications and Patient Condition  Indications for airway management: anesthesia  Sedation level: deep  Preoxygenated: yes  Patient position: sniffing  Mask difficulty assessment: 1 - vent by mask    Final Airway Details  Final airway type: endotracheal airway      Successful airway: ETT  Cuffed: yes   Successful intubation technique: direct laryngoscopy  Endotracheal tube insertion site: oral  Blade: Magdiel  Blade size: #3  ETT size (mm): 7.0    Cormack-Lehane Classification: grade IIA - partial view of glottis  Placement verified by: chest auscultation and capnometry   Measured from: lips  ETT to lips (cm): 20  Number of attempts at approach: 1

## 2023-02-08 NOTE — PHYSICAL THERAPY NOTE
Orders received and hx and chart reviewed. Per OT, Tati Montez, pt evaluated and does not have any skilled inpt PT needs at this time. Thank you.

## 2023-02-08 NOTE — BRIEF OP NOTE
Pre-Operative Diagnosis: Fracture of humeral shaft, left, closed [S42.302A]     Post-Operative Diagnosis: Fracture of humeral shaft, left, closed [S42.302A]      Procedure Performed:   OPEN REDUCTION INTERNAL FIXATION LEFT HUMERAL SHAFT FRACTURE    Surgeon(s) and Role:     Claude Arguelles MD - Primary    Assistant(s):        Surgical Findings: comminuted shaft fx     Specimen: none     Estimated Blood Loss: Blood Output: 150 mL (2/7/2023 10:04 PM)      Dictation Number:       Aleemike Henriquez MD  2/7/2023  10:32 PM

## 2023-02-08 NOTE — H&P
Hu Damian MD - 2023 10:30 AM CST  Formatting of this note is different from the original.  Subjective:   Patient ID: Peewee Viveros is a 52year old female. She is a right-hand-dominant female comes today for evaluation left arm. She fell on the evening of  and sustained injury to the left arm. She had difficulty with motion of the arm. She presented to the [facility] emergency department and x-rays did show a fracture of the humeral shaft. She has continued pain. She denies any numbness or tingling. History/Other:   Review of Systems   Constitutional: Negative for chills, diaphoresis and fever. Respiratory: Negative for cough, choking and shortness of breath. Cardiovascular: Negative for chest pain. Gastrointestinal: Negative for diarrhea, nausea and vomiting. Neurological: Negative. Psychiatric/Behavioral: Negative. Current Outpatient Medications   Medication Sig Dispense Refill   Albuterol Sulfate HFA (PROAIR HFA) 108 (90 Base) MCG/ACT Inhalation Aero Soln Inhale 2 puffs into the lungs every 4 (four) hours as needed. 1 Inhaler 0     Allergies:No Known Allergies    HISTORY:  Past Medical History:   Diagnosis Date   Breast nodule    Graves disease   remission since    Toxic diffuse goiter     Past Surgical History:   Procedure Laterality Date   BENIGN BIOPSY RIGHT    Right breast    , 2005   x 2   OTHER SURGICAL HISTORY In 2009   Right breast duct removed   TONSILLECTOMY At age 15   VASECTOMY    had. Family History   Problem Relation Age of Onset   Thyroid Disorder Sister   Hypothyroidism   Hypertension Mother   Cancer Mother   Other (colon ca) Mother   diagnosed at 77   Diabetes Maternal Grandmother   maternal side   Diabetes Maternal Grandfather   Thyroid Disorder Sister   Lupus. Lymes disease. Thyroid Disorder Sister   Bhavna Mass immune\" disease. Psychiatric Sister   Mood disorder.      Social History: Social History  Tobacco Use  Smoking status: Former  Packs/day: 0.50  Types: Cigarettes  Quit date: 2005  Years since quittin.1  Smokeless tobacco: Never  Tobacco comments: smoked for 4 years on and off. Alcohol use: Yes  Alcohol/week: 2.0 - 3.0 standard drinks  Types: 2 - 3 Standard drinks or equivalent per week  Comment: Social  Drug use: No      Objective:   Physical Exam  Constitutional:   Appearance: Normal appearance. HENT:   Head: Normocephalic. Cardiovascular:   Rate and Rhythm: Normal rate and regular rhythm. Pulses: Normal pulses. Heart sounds: Normal heart sounds. Pulmonary:   Effort: Pulmonary effort is normal.   Breath sounds: Normal breath sounds. Abdominal:   General: Bowel sounds are normal.   Palpations: Abdomen is soft. Musculoskeletal:   Cervical back: Normal range of motion and neck supple. Comments: Examination left arm shows tenderness palpation at the proximal humerus and pain with any attempted motion of the shoulder. She is able to move the fingers and sensation motor function are intact distally. The skin is intact. Skin:  General: Skin is warm and dry. Capillary Refill: Capillary refill takes less than 2 seconds. Neurological:   General: No focal deficit present. Mental Status: She is alert and oriented to person, place, and time. Psychiatric:   Mood and Affect: Mood normal.   Behavior: Behavior normal.   Thought Content: Thought content normal.   Judgment: Judgment normal.     Assessment & Plan:   Closed displaced comminuted fracture of shaft of left humerus, initial encounter (primary encounter diagnosis)    Orders Placed This Encounter  OFFICE/OUTPT VISIT,ARNIE COLVIN III    In discussing treatment options with the patient and her , I was able to review the x-rays. This did show evidence of a fracture of the proximal third of the humeral shaft with a butterfly fragment and displacement. Treatment option discussed including operative versus nonoperative care.  At this point based on the degree of comminution and displacement they would like to proceed with surgery. The risk and benefits surgery including risk of incomplete and delayed recovery of function, chronic pain, and stiffness were discussed. They show good understanding these issues. We can offer proceed with surgery in the next few days. Meds This Visit:  Requested Prescriptions     No prescriptions requested or ordered in this encounter     Imaging & Referrals:  [facility] SURG [address]  Electronically signed by Florentin Armijo MD at 02/06/2023 11:35 AM CST  Maria Ines Colin MD    Pre-op Diagnosis: Fracture of humeral shaft, left, closed [S42.302A]    The above referenced H&P was reviewed by Maria Ines Colin MD on 2/7/2023, the patient was examined and no significant changes have occurred in the patient's condition since the H&P was performed. I discussed with the patient and/or legal representative the potential benefits, risks and side effects of this procedure; the likelihood of the patient achieving goals; and potential problems that might occur during recuperation. I discussed reasonable alternatives to the procedure, including risks, benefits and side effects related to the alternatives and risks related to not receiving this procedure. We will proceed with procedure as planned.     Maria Ines Colin MD

## (undated) DEVICE — 3M™ IOBAN™ 2 ANTIMICROBIAL INCISE DRAPE 6650EZ: Brand: IOBAN™ 2

## (undated) DEVICE — PROXIMATE SKIN STAPLERS (35 WIDE) CONTAINS 35 STAINLESS STEEL STAPLES (FIXED HEAD): Brand: PROXIMATE

## (undated) DEVICE — 3M™ STERI-STRIP™ COMPOUND BENZOIN TINCTURE 40 BAGS/CARTON 4 CARTONS/CASE C1544: Brand: 3M™ STERI-STRIP™

## (undated) DEVICE — DRESS WOUND AQUACEL 3.5INX12IN

## (undated) DEVICE — WIRE 2MM 152MM TOP TRY SS: Type: IMPLANTABLE DEVICE | Site: HUMERUS

## (undated) DEVICE — GOWN AERO CHROME XXL

## (undated) DEVICE — C-ARM: Brand: UNBRANDED

## (undated) DEVICE — UNDYED BRAIDED (POLYGLACTIN 910), SYNTHETIC ABSORBABLE SUTURE: Brand: COATED VICRYL

## (undated) DEVICE — STOCK SURG LG 48X9IN

## (undated) DEVICE — STERILE HOOK LOCK LATEX FREE ELASTIC BANDAGE 6INX5YD: Brand: HOOK LOCK™

## (undated) DEVICE — KIT TRC TRIMANO BEACH CHR ARM

## (undated) DEVICE — STANDARD HYPODERMIC NEEDLE,POLYPROPYLENE HUB: Brand: MONOJECT

## (undated) DEVICE — SUT MONOCRYL 4-0 PS-2 Y496G

## (undated) DEVICE — TOWEL SURG OR 17X30IN BLUE

## (undated) DEVICE — PADDING CAST COTTON STER 6

## (undated) DEVICE — SOL NACL IRRIG 0.9% 1000ML BTL

## (undated) DEVICE — STERILE POLYISOPRENE POWDER-FREE SURGICAL GLOVES: Brand: PROTEXIS

## (undated) DEVICE — SLEEVE KENDALL SCD EXPRESS MED

## (undated) DEVICE — PAD,ABDOMINAL,8"X7.5",ST,LF,20/BX: Brand: MEDLINE INDUSTRIES, INC.

## (undated) DEVICE — SYRINGE BULB 50/CS 48/PLT: Brand: MEDEGEN MEDICAL PRODUCTS, LLC

## (undated) DEVICE — SUT VICRYL 0 CP-1 J267H

## (undated) DEVICE — DRILL BIT 2.7MM 2142-27-070

## (undated) DEVICE — SYRINGE 30ML LL TIP

## (undated) DEVICE — UPPER EXTREMITY CDS-LF: Brand: MEDLINE INDUSTRIES, INC.

## (undated) DEVICE — Device

## (undated) DEVICE — STERILE SYNTHETIC POLYISOPRENE POWDER-FREE SURGICAL GLOVES WITH HYDROGEL COATING: Brand: PROTEXIS

## (undated) DEVICE — 3M™ STERI-STRIP™ REINFORCED ADHESIVE SKIN CLOSURES, R1547, 1/2 IN X 4 IN (12 MM X 100 MM), 6 STRIPS/ENVELOPE: Brand: 3M™ STERI-STRIP™